# Patient Record
Sex: MALE | Race: WHITE | NOT HISPANIC OR LATINO | URBAN - METROPOLITAN AREA
[De-identification: names, ages, dates, MRNs, and addresses within clinical notes are randomized per-mention and may not be internally consistent; named-entity substitution may affect disease eponyms.]

---

## 2018-01-15 ENCOUNTER — CONVERSION ENCOUNTER (OUTPATIENT)
Dept: NEUROLOGY | Facility: CLINIC | Age: 83
End: 2018-01-15

## 2018-01-15 ENCOUNTER — OFFICE VISIT CONVERTED (OUTPATIENT)
Dept: FAMILY MEDICINE CLINIC | Facility: CLINIC | Age: 83
End: 2018-01-15
Attending: NURSE PRACTITIONER

## 2018-01-15 ENCOUNTER — OFFICE VISIT CONVERTED (OUTPATIENT)
Dept: NEUROLOGY | Facility: CLINIC | Age: 83
End: 2018-01-15
Attending: PSYCHIATRY & NEUROLOGY

## 2018-01-19 ENCOUNTER — OFFICE VISIT CONVERTED (OUTPATIENT)
Dept: ONCOLOGY | Facility: HOSPITAL | Age: 83
End: 2018-01-19
Attending: NURSE PRACTITIONER

## 2018-01-26 ENCOUNTER — OFFICE VISIT CONVERTED (OUTPATIENT)
Dept: PODIATRY | Facility: CLINIC | Age: 83
End: 2018-01-26
Attending: PODIATRIST

## 2018-02-14 ENCOUNTER — OFFICE VISIT CONVERTED (OUTPATIENT)
Dept: ONCOLOGY | Facility: HOSPITAL | Age: 83
End: 2018-02-14
Attending: NURSE PRACTITIONER

## 2018-02-15 ENCOUNTER — TELEPHONE CONVERTED (OUTPATIENT)
Dept: ONCOLOGY | Facility: HOSPITAL | Age: 83
End: 2018-02-15

## 2021-05-16 VITALS
BODY MASS INDEX: 22.33 KG/M2 | HEART RATE: 76 BPM | SYSTOLIC BLOOD PRESSURE: 100 MMHG | WEIGHT: 134 LBS | HEIGHT: 65 IN | DIASTOLIC BLOOD PRESSURE: 56 MMHG

## 2021-05-16 VITALS — OXYGEN SATURATION: 96 % | BODY MASS INDEX: 21.03 KG/M2 | HEART RATE: 63 BPM | WEIGHT: 134 LBS | HEIGHT: 67 IN

## 2021-05-16 VITALS
WEIGHT: 133.37 LBS | SYSTOLIC BLOOD PRESSURE: 86 MMHG | HEART RATE: 76 BPM | TEMPERATURE: 96.9 F | HEIGHT: 65 IN | DIASTOLIC BLOOD PRESSURE: 64 MMHG | RESPIRATION RATE: 16 BRPM | BODY MASS INDEX: 22.22 KG/M2

## 2021-05-28 VITALS
HEIGHT: 66 IN | DIASTOLIC BLOOD PRESSURE: 47 MMHG | HEART RATE: 62 BPM | WEIGHT: 136.69 LBS | OXYGEN SATURATION: 87 % | HEIGHT: 66 IN | TEMPERATURE: 96.8 F | RESPIRATION RATE: 16 BRPM | DIASTOLIC BLOOD PRESSURE: 43 MMHG | OXYGEN SATURATION: 96 % | RESPIRATION RATE: 14 BRPM | WEIGHT: 133.6 LBS | SYSTOLIC BLOOD PRESSURE: 103 MMHG | SYSTOLIC BLOOD PRESSURE: 83 MMHG | HEART RATE: 67 BPM | BODY MASS INDEX: 21.47 KG/M2 | BODY MASS INDEX: 21.97 KG/M2 | TEMPERATURE: 97.2 F

## 2021-05-28 NOTE — PROGRESS NOTES
Patient: BARB STEPHENSON     Acct: [id number]     Report: #[id number]  UNIT #: [id number]     : 1923    Encounter Date:2018  PRIMARY CARE: SABIHA ARBOLEDA [facility]  ***Signed***  --------------------------------------------------------------------------------------------------------------------  Chief Complaint      2018      LUNG CA            Current Plan      -Lung Cancer, Adenocarcinoma      -Left lower lung mass, TTF positive, napsin positive. p40, CK 5/6 negative.       -PDL-1 testing is pending still from [facility].       -Poor performance status. Patient uses a cane to ambulate. Has dementia.       -PET scan from 10/12/10098 shows: PET/CT demonstrating intensely hypermetabolic     5.5 cm mass in the left lower lobe. Minimally hypermetabolic 1.1 cm x 0.6 cm     lymph node posterior to the distal left mainstem bronchus. This is in close     proximity to the esophagus.      -Opdivo every 2 weeks x 12 cycles started on 2017.             -            -Interval Clinic Visit Changes      -Doing well with Opdivo. # 6 days.       -RTC in 2 weeks for next cycle.       -Patient denies diarrhea, will check TSH today.       -Noted dryness to bilateral hands, encouraged moisturizers.       -Will order scans prior to the next visit.              -Hypotension: Resolved.       -Blood pressure 83/43 at today's visit       -Normal saline bolus, 1 liter given.       -Medications reviewed.       -Close observation.             -Insomnia      -Has tried multiple meds.       -Will try Flexeril, 15 mg, at bedtime PRN.      -Daughter has Flexeril has med at home.      -She will let us know if she needs refill for med.             -Parkinson's Disease      -Diagnosed with Parkinson's syndrome.      -Started on Sinemet, and Pramiprexole      -Follows with Dr. Hou.       -Close observation.              -Lewy Body Dementia      -Uses cane for ambulation assistance.       -Takes Namenda, Aricept.      -LIves  with daughter      -Close observation.             Medical Evaluation of Chemotherapy Associated Toxicities Today      -Chemotherapy associated fatigue      -Patient's weakness has remained stable.      -Recommended Exercise.       -Continue close observation.      -Chemotherapy associated nausea      -Patient's nausea has remained stable.      -Currently on Anti-Nausea medications.       -Continue close observation.      -Chemotherapy associated anemia      -Does not require blood transfusion on today's visit.       -Transfuse blood when hemoglobin is less than 7.      -Continue close observation.      -Chemotherapy associated thrombocytopenia      -Does not require platelet transfusion on today's visit.       -Transfuse platelet when platelet count < 20,000.       -Continue close observation.      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       -Continue current management.       -Today's Evaluation      -ECOG 1.       -Patient's imaging exams, blood tests, physicians' notes, and any new findings     since our last visit were reviewed today to reassess patient's medical     treatment plan. .       -Old medical records were reviewed and summarized in chronological order in the     HPI today to maintain an updated medical record.       -Patient's radiology imaging tests from our last visit were reviewed     independently by me by direct visualization of the images.        -Patients current lab tests and medications were carefully reviewed to evaluate     patient's current treatment plan today. Proceed with chemotherapy plan.       -Patient was advised to call us right away if there are any new symptoms for an     urgent visit for further evaluation. Patient voiced understanding and agreed to     do so.      Intensive Monitor for Toxicity:  Labs Reviewed, Chemo Orders Reviewd, Meds\    Narcotics Reviewed      Labs Reviewed During Visit?:  Yes      Patient Education Provided:  Yes       ECOG Score:  1            Date      February 14, 2018.      Presents today with daughter for cycle 8 of Opdivo.             Doing well. Has no complaints.  Gaining weight.             Meds increased recently for Parkinson's disease per neurologist. Daughter     reports doing better.             Labs reviewed. OK for treatment today. Thyroid function WNL. TSH rising. Will     watch for now.      ECOG      ECOG Performance Status:  1            History and Present Illness      -September 14, 2017. pathology: NSCLC adencarcinoma type left lower lobe mass.     TTF-1 Napsin 1 positive, p40/CK 5/6 negative. PDL-1 testing pending.       -September 20, 2017. 1. Interval enlargement and decrease in density of the     previously demonstrated mass within the left lower lobe now measuring 5.5 x 3.9     cm in size the. The appearance is most compatible with tumor necrosis. There is     new surrounding air space disease which may be secondary to pneumonia,     atelectasis, or treatment related change. 2. New small left pleural effusion.     3. No change in noncalcified nodular densities within the right upper and lower     lobes. 4. No change in 1.3 cm nodular density anterior to the ascending aorta     which could represent a pericardial cyst or mildly enlarged mediastinal lymph     node.      -September 27, 2017. Head CT: No evidence of metastatic disease. No acute     intracranial abnormality.      -October 12, 2017. PET scan: PET/CT demonstrating intensely hypermetabolic 5.5     cm mass in the left lower lobe. Minimally hypermetabolic 1.1 cm x 0.6 cm lymph     node posterior to the distal left mainstem bronchus. This is in close proximity     to the esophagus.      October 13, 2017. Iron 45, Ferritin 82, Iron sat 13 %, TIBC 350, B-12 406,     Folate 16.0      -October 20, 2017. Cycle 1 of 12 Opdivo given.       -November 3, 2017. Cycle 2 of 12 Opdivo given.       -November 17, 2017. Cycle 3 of 12 Opdivo given. Fereheme  infusion given. TSH     3.01, Free T4 1.0      -November . Feraheme #1 given. No complications.       -December 1, 2017. Cycle 4 of Opdivo given.       -December . Cycle 5 Opdivo given. TSH 3.17, free T4 1.1.       -December 29, 2017. Cycle 6 of Opdivo given.       -January 19, 2018. Cycle 7 Opdivo given.       -February 14, 2018. Cycle 8 Opdivo. TSH 4.370, free T4 0.9.              Mr. Matt John is a 89 year old  male who presents for post hospital     discharge with his 2 daughters. Recently, hospitalized for pneumonia and     recently found to have lung cancer. Medical history includes: osteopenia,     hyperkalemia, tremors, allergic rhinitis, nocturnal leg cramps, restless leg     syndrome, Lewy body dementia, anemia, gait abnormality, anxiety disorder, sick     sinus syndrome, contact dermatitis,  squamous cell carcinoma, macular     degeneration, previous MI,  CAD. Surgical history includes: appendectomy,     rectal tumor, ear surgery, cataract surgery. Patient has multiple drug     allergies. See list. The patient lives with his daughter. ROS: 4 pound weight     loss,  constipation, chronic low back pain, cough, dementia, insomnia. Patient     quit smoking 8 years ago. Denies any            Vitals      Height 66 in / 167.64 cm      Weight 136 lbs 10.963 oz / 62 kg      BSA 1.70 m2      BMI 22.1 kg/m2      Temperature 96.8 F / 36 C - Temporal      Pulse 67      Respirations 14      Blood Pressure 103/47      Pulse Oximetry 96%, ROOM AIR            Allergies      Coded Allergies:             HYDROCORTISONE (Verified  Allergy, Unknown, RASH, ITCHING, 1/19/18)           ADHESIVE (Verified  Adverse Reaction, Unknown, CAUSES SKIN TEARS, 1/19/18)           LUTEIN (Verified  Adverse Reaction, Unknown, UNK REACTION, 1/19/18)           NEOMYCIN (Verified  Adverse Reaction, Unknown, UNK REACTION, 1/19/18)           SULFA (SULFONAMIDE ANTIBIOTICS) (Verified  Adverse Reaction, Unknown, UNK      REACTION, 1/19/18)           THIMEROSAL (Verified  Adverse Reaction, Unknown, UNK REACTION, 1/19/18)           ZINC (Verified  Adverse Reaction, Unknown, UNK REACTION, 1/19/18)            Medications      Last Reconciled on 2/14/18 12:33 by FAUSTINA TIPTON      Vitamin B Complex (Vitamin B Complex*) 1 Each Tablet      1 TAB PO QDAY, TAB         Reported         2/14/18       Pramipexole (Pramipexole) 0.125 Mg Tab      0.125 MG PO HS for 30 Days, #30 TAB         Reported         11/17/17       Carbidopa/Levodopa CR 25/100 Mg (Sinemet CR 25/100 Mg) 1 Each Tablet.er      1 TAB PO QID, TAB.SA         Reported         11/17/17       Multivitamins/Minerals (Ocuvite) 1 Each Tablet      1 EA PO BID, TAB         Reported         11/15/17       Memantine (Namenda) 10 Mg Tablet      10 MG PO QDAY, TAB         Reported         10/26/17       (Namzaric) 28/10  No Conflict Check      1 TAB PO HS         Prov: Patrice Swanson         10/5/17       Bisacodyl (Dulcolax) 5 Mg Tablet.      5-10 MG PO QDAY Y for CONSTIPATION, TAB         Reported         9/21/17       Aspirin (Aspirin*) 81 Mg Tab.chew      81 MG PO QDAY, #30 TAB.CHEW 0 Refills         Reported         9/14/17       Folic Acid (Folic Acid*) 0.4 Mg Tablet      400 MCG PO QDAY, TAB         Reported         9/14/17       Calcium Carb/Vit D3 (Calcium Carb 600 + D) 1 Each Tablet      1 EACH PO BID, #60 TAB 0 Refills         Reported         9/14/17       Metoprolol Succinate (Toprol XL*) 25 Mg Tab.er.24h      25 MG PO HS, #90         Reported         9/14/17       rOPINIRole HCl (Requip) 1 Mg Tablet      1 MG PO QAM, #270         Reported         9/14/17       Mirtazapine (Mirtazapine*) 30 Mg Tablet      30 MG PO HS, #30         Reported         9/14/17       DULoxetine (Cymbalta) 30 Mg Capsule.      30 MG PO QDAY, #30         Reported         9/14/17       Primidone (Mysoline*) 50 Mg Tablet      50 MG PO BID, #60         Reported         9/14/17            General  Information      Primary Provider:  SABIHA ARBOLEDA [facility]            Pain and Fatigue Scales      Pain Assessment:           Experiencing any pain?:  No         Pain Scale Used:  Numerical         Pain Intensity:  0      Fatigue:           Experiencing any fatigue?:  Yes         Fatigue (0-10 scale):  6            Chemo Status      On Oral Chemotherapy?:  No            Other      Clinical Trial Participant?:  No            Past Medical History      No Diabetes Type 1, No Diabetes Type 2, No Thyroid Disease, No COPD, No     Emphysema, No Hypertension, No Stroke, No High Cholesterol, Yes Heart Attack,     No Bleeding Condition, No Low or High RBC Count, No Low or High WBC Count, No     Low or High Platelet Coun, No Hepatitis, No Kidney Disease, No Depression, No     Alzheimer's Disease, No Mental Disease, No Seizures, No Arthritis, No     Osteoporosis, Yes Osteopenia, No Short of Air, No Sleep apnea, No Liver Disease    , No STD, No Enlarged Prostate, Yes Other (HYPERKALEMIA, TREMORS, ALLERGIC     PHINITIS, NOCTURNAL LEG CRAMPS, RESTLESS LEG SYNDROME, DEMENTIA WITH LEWY BODIES    , ANXIETY DISORDER, GAIT ABNORMALITY, SICK SINUS SYNDROME, CONTACT DERMATITIS,     CORONARY ARTERY DISEASE, NEUROPATHY)      Hematology/oncology:  REPORTS HX OF: Anemia, Lung cancer, DENIES HX OF:     Previous Treatment for CA, Bladder Cancer, Blood cancer, Brain cancer, Breast     cancer, Coagulopathy, Colon Cancer, Colorectal cancer, Endocrine cancer, Eye     cancer, GI cancer,  cancer, Kidney cancer, Leukemia, Leukocytosis, Leukopenia    , Liver cancer, Lymphoma, Musculoskeletal cancer, Myeloma, Neurologic cancer,     Oral cancer, Pancreatic Cancer, Prostate cancer, Skin cancer, Stomach cancer,     Testicular cancer, Thrombocytopenia, Thyroid cancer, Other cancer history,     Other hematologic history      Genetic/metabolic:  DENIES HX OF: Cystic fibrosis, Down syndrome, Other genetic     history, Other metabolic history            Past  Surgical History      REPORTS HX OF: Cataract extraction, Appendectomy, Skin cancer removal, Biopsy (    BRONCHOSCOPY NEEDLE BIOSPY), Other Past Surgical Hx (rectal tumor,ear, pacemaker    , vitreoretinal surgery for large pigment patch at Macular behind right eye,     laser eye surgery, ), DENIES HX OF: Thyroid surgery, Lung biopsy, CABG surgery,     Coronary stent, Valve replacement, Cholecystectomy, Splenectomy, Bladder surgery    , Nephrectomy, Joint replacement, Frature repair, Melanoma excision, Spinal     surgery, Breast biopsy, Lumpectomy, Mastectomy, bilateral, Mastectomy, right,     Mastectomy, left, Hysterectomy, Peg Tube Placement, VAD Placement            Family History      REPORTS HX OF: Other Cancer History (pancreatic cancer-brother), DENIES HX OF:     Anemia, Blood disorders, Blood Cancer, Breast cancer, Cervical cancer,     Coagulopathy, Colorectal cancer, Endocrine Cancer, Eye Cancer, GI Cancer,      Cancer, Kidney Cancer, Leukemia, Leukocytosis, Leukopenia, Liver Cancer, Lung     cancer, Lymphoma, Melanoma, Musculoskeletal Cancer, Myeloma, Neurologic Cancer,     Oral Cancer, Ovarian cancer, Prostate cancer, Skin Cancer, Stomach Cancer,     Testicular Cancer, Thrombocytopenia, Thyroid cancer, Uterine cancer, Other     Hematology History            Social History      No      lives with daughter.            Tobacco Use      Tobacco status:  Former smoker (1 ppd for 60 years quit for 8 years)      Quit status:  Quit date established (8 years ago)            Alcohol Use      Alcohol intake:  None            Substance Use      Substance use:  Denies use            ROS      General:  Complains of: Fatigue, Weight gain, Denies: Fever, Night sweats,     Weight loss      Eyes:  Denies: Blurred vision, Corrective lenses, Diplopia, Eye irritation, Eye     pain, Eye redness      Ears, nose, mouth, throat:  Denies: Headache, Seizures, Visual Changes, Hearing     loss, Sinus Congestion, Hoarseness, Sore  throat      Cardiovascular:  Denies: Chest pain, Irregular heartbeat, Palpitations, Swollen     ankles/legs      Respiratory:  Denies: Chest pain, Productive cough, Coughing blood      Gastrointestinal:  Complains of: Constipation, Denies: Nausea, Vomiting,     Problem swallowing, Frequent heartburn, Diarrhea      Kidney/Bladder:  Denies: Painful Urination, Blood in Urine, Incontinence,     Frequent Urination, Decreased Urine Stream      Musculoskeletal:  Denies: New Back pain, Leg Cramps, Painful Joints, Swollen     Joints, Muscle Pain, Muscle weakness      Skin:  DENIES: Bruises Easily, Hair changes, Lesions/changes in moles, Nail     changes      Neurological:  Denies: Dizziness, Fainting, Numbness\Tingling, Paralysis,     Seizures      Psychiatric:  Complains of: AAO X 3, Memory loss, Denies: Anxiety      Endocrine:  DiabetesThyroid DisorderOsteoporosis      Hematologic/lymphatic:  Denies: Bruising, Bleeding, Enlarged Lymph Nodes,     Recurrent infections            Vitals            Vital Signs              Date Time  Temp Pulse Resp B/P (MAP) Pulse Ox O2 Delivery O2 Flow Rate FiO2             1/19/18 09:55 97.2 62 16 83/43 87 ROOM AIR              General Appearance:  Alert, Oriented X3, Cooperative, No acute distress      Eyes:  Anicteric Sclerae, Moist Conjunctiva, PERRLA      HEENT:  Orophraynx clear, No Erythema, No Exudates      Neck:  Supple, Full ROM, No Masses or JVD      Respiratory:  CTAB, No Diminished Breath, No Rales, No Crackels      Breast\Chest:  Symmetrical, No Nipple Discharge      Abdomen\Gastro:  Soft, No Hernias, No Hepatosplenomegaly      Cardio:  RRR, No Murmur, No, Peripheral Edema      Skin:  Normal Temperature, Normal Tone, Normal Texture and Turgor      Psychiatric:  Appropriate Affect, Intact Judgement, AAO x3      Neuro:  Cranial Nerve II-XII Inta, No Focal Sensory Deficit      Genitourinary:  No Reed Catheter, No Bladder Distention      Muscularskeletal:  Normal Gait and Station,  Full ROM of extremeties      Extremities:  No Digital Cyanosis, Pedal Pulses Intact, Pedal Pulses Symetrical    , Weakness      Lymphatic:  No Cervical, No Axillary            Lab Results      Laboratory Tests      1/19/18 09:35            Hx Influenza Vaccination:  Yes (2017)      Date Influenza Vaccine Given:  Sep 1, 2017      Influenza Vaccine Declined:  No      Hx Pneumococcal Vaccination:  Yes (2017)      Encouraged to follow-up with:  PCP regarding preventative exams.                 Disclaimer: Converted document may not contain table formatting or lab diagrams. Please see [employer] for the authenticated document.

## 2021-05-28 NOTE — PROGRESS NOTES
Patient: BARB STEPHENSON     Acct: [id number]     Report: #[id number]  UNIT #: [id number]     : 1923    Encounter Date:2018  PRIMARY CARE: SABIHA ARBOLEDA [facility]  ***Signed***  --------------------------------------------------------------------------------------------------------------------  ADDENDUM: 18 1800          Addendum: Patrice Swanson on 18 @ 18:00             Patient was seen by me on             Patient was seen in clinic, physical exam was performed, lab and imaging tests     were noted, and above medical documentation was also done by me.  Thank you     very much for the opportunity to participate in your patient's care.  Warm     Regards.             Patrice Swanson MD FACP      Board Certified Hematologist      Board Certified Medical Oncologist            Chief Complaint      2018      LUNG CA, ADENOCARCINOMA            Current Plan      -Lung Cancer, Adenocarcinoma      -Left lower lung mass, TTF positive, napsin positive. p40, CK 5/6 negative.       -PDL-1 testing is pending still from [facility].       -Poor performance status. Patient uses a cane to ambulate. Has dementia.       -PET scan from 10/12/10385 shows: PET/CT demonstrating intensely hypermetabolic     5.5 cm mass in the left lower lobe. Minimally hypermetabolic 1.1 cm x 0.6 cm     lymph node posterior to the distal left mainstem bronchus. This is in close     proximity to the esophagus.      -Opdivo every 2 weeks x 12 cycles started on 2017.             -Hypotension      -Blood pressure 83/43 at today's visit       -Normal saline bolus, 1 liter given.       -Medications reviewed.       -Close observation.             -Interval Clinic Visit Changes      -Doing well with Opdivo.       -RTC in 2 weeks for next cycle.       -Normal saline bolus, 1 liter given for low blood pressure.             -Insomnia      -Has tried multiple meds.       -Will try Flexeril, 15 mg, at bedtime PRN.       -Daughter has Flexeril has med at home.      -She will let us know if she needs refill for med.             -Parkinson's Disease      -Diagnosed with Parkinson's syndrome.      -Started on Sinemet, and Pramiprexole      -Follows with Dr. Hou.       -Close observation.              -Lewy Body Dementia      -Uses cane for ambulation assistance.       -Takes Namenda, Aricept.      -LIves with daughter      -Close observation.             Medical Evaluation of Chemotherapy Associated Toxicities Today      -Chemotherapy associated fatigue      -Patient's weakness has remained stable.      -Recommended Exercise.       -Continue close observation.      -Chemotherapy associated nausea      -Patient's nausea has remained stable.      -Currently on Anti-Nausea medications.       -Continue close observation.      -Chemotherapy associated anemia      -Does not require blood transfusion on today's visit.       -Transfuse blood when hemoglobin is less than 7.      -Continue close observation.      -Chemotherapy associated thrombocytopenia      -Does not require platelet transfusion on today's visit.       -Transfuse platelet when platelet count < 20,000.       -Continue close observation.      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       -Continue current management.       -Today's Evaluation      -ECOG 1.       -Patient's imaging exams, blood tests, physicians' notes, and any new findings     since our last visit were reviewed today to reassess patient's medical     treatment plan. .       -Old medical records were reviewed and summarized in chronological order in the     HPI today to maintain an updated medical record.       -Patient's radiology imaging tests from our last visit were reviewed     independently by me by direct visualization of the images.        -Patients current lab tests and medications were carefully reviewed to evaluate     patient's current treatment plan today. Proceed  with chemotherapy plan.       -Patient was advised to call us right away if there are any new symptoms for an     urgent visit for further evaluation. Patient voiced understanding and agreed to     do so.      Hypotension       Hypotension, unspecified hypotension type       Hypotension type: unspecified hypotension type            Date      January 19, 2018      Presents for Opdivo treatment today for lung cancer. Daughter at chairside.             Blood pressure 83/43 today. Normal saline bolus given, 1 liter started.             Denies any Opdivo treatment related toxicities today.             labs and medications reviewed. OK for treatment.       l      ECOG      ECOG Performance Status:  1            History and Present Illness      -September 14, 2017. pathology: NSCLC adencarcinoma type left lower lobe mass.     TTF-1 Napsin 1 positive, p40/CK 5/6 negative. PDL-1 testing pending.       -September 20, 2017. 1. Interval enlargement and decrease in density of the     previously demonstrated mass within the left lower lobe now measuring 5.5 x 3.9     cm in size the. The appearance is most compatible with tumor necrosis. There is     new surrounding air space disease which may be secondary to pneumonia,     atelectasis, or treatment related change. 2. New small left pleural effusion.     3. No change in noncalcified nodular densities within the right upper and lower     lobes. 4. No change in 1.3 cm nodular density anterior to the ascending aorta     which could represent a pericardial cyst or mildly enlarged mediastinal lymph     node.      -September 27, 2017. Head CT: No evidence of metastatic disease. No acute     intracranial abnormality.      -October 12, 2017. PET scan: PET/CT demonstrating intensely hypermetabolic 5.5     cm mass in the left lower lobe. Minimally hypermetabolic 1.1 cm x 0.6 cm lymph     node posterior to the distal left mainstem bronchus. This is in close proximity     to the esophagus.       October 13, 2017. Iron 45, Ferritin 82, Iron sat 13 %, TIBC 350, B-12 406,     Folate 16.0      -October 20, 2017. Cycle 1 of 12 Opdivo given.       -November 3, 2017. Cycle 2 of 12 Opdivo given.       -November 17, 2017. Cycle 3 of 12 Opdivo given. Fereheme infusion given. TSH     3.01, Free T4 1.0      -November . Feraheme #1 given. No complications.       -December 1, 2017. Cycle 4 of Opdivo given.       -December . Cycle 5 Opdivo given. TSH 3.17, free T4 1.1.       -December 29, 2017. Cycle 6 of Opdivo given.       -January 19, 2018. Cycle 7 Opdivo given.              Mr. Matt John is a 89 year old  male who presents for post hospital     discharge with his 2 daughters. Recently, hospitalized for pneumonia and     recently found to have lung cancer. Medical history includes: osteopenia,     hyperkalemia, tremors, allergic rhinitis, nocturnal leg cramps, restless leg     syndrome, Lewy body dementia, anemia, gait abnormality, anxiety disorder, sick     sinus syndrome, contact dermatitis,  squamous cell carcinoma, macular     degeneration, previous MI,  CAD. Surgical history includes: appendectomy,     rectal tumor, ear surgery, cataract surgery. Patient has multiple drug     allergies. See list. The patient lives with his daughter. ROS: 4 pound weight     loss,  constipation, chronic low back pain, cough, dementia, insomnia. Patient     quit smoking 8 years ago. Denies any            Vitals      Height 66 in / 167.64 cm      Weight 133 lbs 9.580 oz / 60.6 kg      BSA 1.68 m2      BMI 21.6 kg/m2      Temperature 97.2 F / 36.22 C - Temporal      Pulse 62      Respirations 16      Blood Pressure 83/43      Pulse Oximetry 87%, ROOM AIR            Allergies      Coded Allergies:             HYDROCORTISONE (Verified  Allergy, Unknown, RASH, ITCHING, 12/29/17)           ADHESIVE (Verified  Adverse Reaction, Unknown, CAUSES SKIN TEARS, 12/29/17)           LUTEIN (Verified  Adverse Reaction,  Unknown, UNK REACTION, 12/29/17)           NEOMYCIN (Verified  Adverse Reaction, Unknown, UNK REACTION, 12/29/17)           SULFA (SULFONAMIDE ANTIBIOTICS) (Verified  Adverse Reaction, Unknown, UNK     REACTION, 12/29/17)           THIMEROSAL (Verified  Adverse Reaction, Unknown, UNK REACTION, 12/29/17)           ZINC (Verified  Adverse Reaction, Unknown, UNK REACTION, 12/29/17)            Medications      Last Reconciled on 1/20/18 15:34 by FAUSTINA TIPTON      Loperamide (Loperamide) 2 Mg Capsule      2 MG PO Q4H, #30 TAB 1 Refill         Prov: Patrice Swanson         12/1/17       Nystatin (Nystatin*) 100,000 Unit/1 Ml Oral.susp      10 ML SWISH.SWAL Q6HR for 10 Days, #400 ML 0 Refills         Prov: FAUSTINA TIPTON onc         11/17/17       Pramipexole (Pramipexole) 0.125 Mg Tab      0.125 MG PO HS for 30 Days, #30 TAB         Reported         11/17/17       Carbidopa/Levodopa CR 25/100 Mg (Sinemet CR 25/100 Mg) 1 Each Tablet.er      1 TAB PO TID, TAB.SA         Reported         11/17/17       traMADol (Ultram) 50 Mg Tablet      50 MG PO Q6H Y for PAIN, #4 TAB 0 Refills         Prov: Samuel Suh         11/15/17       Multivitamins/Minerals (Ocuvite) 1 Each Tablet      1 EA PO BID, TAB         Reported         11/15/17       Memantine (Namenda) 10 Mg Tablet      10 MG PO QDAY, TAB         Reported         10/26/17       Loratadine (Claritin) 10 Mg Tablet      10 MG PO HS Y for ALLERGIES, #30 TAB 0 Refills         Reported         10/5/17       (Namzaric) 28/10  No Conflict Check      1 TAB PO HS         Prov: Patrice Swanson         10/5/17       Bisacodyl (Dulcolax) 5 Mg Tablet.dr      5 MG PO QDAY Y for CONSTIPATION, TAB         Reported         9/21/17       Aspirin (Aspirin*) 81 Mg Tab.chew      81 MG PO QDAY, #30 TAB.CHEW 0 Refills         Reported         9/14/17       Folic Acid (Folic Acid*) 0.4 Mg Tablet      400 MCG PO QDAY, TAB         Reported         9/14/17       Calcium Carb/Vit D3 (Calcium  Carb 600 + D) 1 Each Tablet      1 EACH PO BID, #60 TAB 0 Refills         Reported         9/14/17       Metoprolol Succinate (Toprol XL*) 25 Mg Tab.er.24h      25 MG PO HS, #90         Reported         9/14/17       rOPINIRole HCl (Requip) 1 Mg Tablet      1 MG PO QAM, #270         Reported         9/14/17       Mirtazapine (Mirtazapine*) 30 Mg Tablet      30 MG PO HS, #30         Reported         9/14/17       DULoxetine (Cymbalta) 30 Mg Capsule.dr      30 MG PO QDAY, #30         Reported         9/14/17       Primidone (Mysoline*) 50 Mg Tablet      50 MG PO BID, #60         Reported         9/14/17            General Information      Primary Provider:  SABIHA ARBOLEDA gretchen            Pain and Fatigue Scales      Pain Assessment:           Experiencing any pain?:  No         Pain Scale Used:  Numerical      Fatigue:           Experiencing any fatigue?:  Yes         Fatigue (0-10 scale):  0 (none)            Chemo Status      On Oral Chemotherapy?:  No            Other      Clinical Trial Participant?:  No            Past Medical History      No Diabetes Type 1, No Diabetes Type 2, No Thyroid Disease, No COPD, No     Emphysema, No Hypertension, No Stroke, No High Cholesterol, Yes Heart Attack,     No Bleeding Condition, No Low or High RBC Count, No Low or High WBC Count, No     Low or High Platelet Coun, No Hepatitis, No Kidney Disease, No Depression, No     Alzheimer's Disease, No Mental Disease, No Seizures, No Arthritis, No     Osteoporosis, Yes Osteopenia, No Short of Air, No Sleep apnea, No Liver Disease    , No STD, No Enlarged Prostate, Yes Other (HYPERKALEMIA, TREMORS, ALLERGIC     PHINITIS, NOCTURNAL LEG CRAMPS, RESTLESS LEG SYNDROME, DEMENTIA WITH LEWY BODIES    , ANXIETY DISORDER, GAIT ABNORMALITY, SICK SINUS SYNDROME, CONTACT DERMATITIS,     CORONARY ARTERY DISEASE, NEUROPATHY)      Hematology/oncology:  REPORTS HX OF: Anemia, Lung cancer, DENIES HX OF:     Previous Treatment for CA, Bladder Cancer, Blood  cancer, Brain cancer, Breast     cancer, Coagulopathy, Colon Cancer, Colorectal cancer, Endocrine cancer, Eye     cancer, GI cancer,  cancer, Kidney cancer, Leukemia, Leukocytosis, Leukopenia    , Liver cancer, Lymphoma, Musculoskeletal cancer, Myeloma, Neurologic cancer,     Oral cancer, Pancreatic Cancer, Prostate cancer, Skin cancer, Stomach cancer,     Testicular cancer, Thrombocytopenia, Thyroid cancer, Other cancer history,     Other hematologic history      Genetic/metabolic:  DENIES HX OF: Cystic fibrosis, Down syndrome, Other genetic     history, Other metabolic history            Past Surgical History      REPORTS HX OF: Cataract extraction, Appendectomy, Skin cancer removal, Biopsy (    BRONCHOSCOPY NEEDLE BIOSPY), Other Past Surgical Hx (rectal tumor,ear, pacemaker    , vitreoretinal surgery for large pigment patch at Macular behind right eye,     laser eye surgery, ), DENIES HX OF: Thyroid surgery, Lung biopsy, CABG surgery,     Coronary stent, Valve replacement, Cholecystectomy, Splenectomy, Bladder surgery    , Nephrectomy, Joint replacement, Frature repair, Melanoma excision, Spinal     surgery, Breast biopsy, Lumpectomy, Mastectomy, bilateral, Mastectomy, right,     Mastectomy, left, Hysterectomy, Peg Tube Placement, VAD Placement            Family History      REPORTS HX OF: Other Cancer History (pancreatic cancer-brother), DENIES HX OF:     Anemia, Blood disorders, Blood Cancer, Breast cancer, Cervical cancer,     Coagulopathy, Colorectal cancer, Endocrine Cancer, Eye Cancer, GI Cancer,      Cancer, Kidney Cancer, Leukemia, Leukocytosis, Leukopenia, Liver Cancer, Lung     cancer, Lymphoma, Melanoma, Musculoskeletal Cancer, Myeloma, Neurologic Cancer,     Oral Cancer, Ovarian cancer, Prostate cancer, Skin Cancer, Stomach Cancer,     Testicular Cancer, Thrombocytopenia, Thyroid cancer, Uterine cancer, Other     Hematology History            Social History      No            Tobacco Use       Tobacco status:  Former smoker (1 ppd for 60 years quit for 8 years)      Quit status:  Quit date established (8 years ago)            Alcohol Use      Alcohol intake:  None            Substance Use      Substance use:  Denies use            ROS      General:  Denies: Appetite change, Fatigue, Fever, Night sweats, Weight gain      Eyes:  Complains of: Corrective lenses, Denies: Blurred vision, Diplopia, Eye     irritation, Eye pain      Ears, nose, mouth, throat:  Denies: Seizures, Visual Changes, Hearing loss,     Sinus Congestion, Hoarseness      Cardiovascular:  Denies: Irregular heartbeat, Palpitations, Swollen ankles/legs      Respiratory:  Denies: Chest pain, Shortness of Air, Productive cough, Coughing     blood      Gastrointestinal:  Denies: Nausea, Vomiting, Problem swallowing, Frequent     heartburn, Constipation, Diarrhea      Kidney/Bladder:  Denies: Painful Urination, Blood in Urine, Incontinence,     Frequent Urination      Musculoskeletal:  Denies: New Back pain, Leg Cramps, Painful Joints, Muscle Pain      Skin:  DENIES: Bruises Easily, Hair changes      Neurological:  Denies: Dizziness, Fainting, Seizures      Psychiatric:  Complains of: AAO X 3, Memory loss, Denies: Anxiety      Endocrine:  DiabetesThyroid Disorder      Hematologic/lymphatic:  Denies: Bruising, Bleeding, Enlarged Lymph Nodes            Vitals            Vital Signs              Date Time  Temp Pulse Resp B/P (MAP) Pulse Ox O2 Delivery O2 Flow Rate FiO2             12/29/17 10:32 98.6 60 16 96/44 98 ROOM AIR              General Appearance:  Alert, Oriented X3, Cooperative, No acute distress      Eyes:  Anicteric Sclerae, Moist Conjunctiva, PERRLA      HEENT:  Orophraynx clear, No Erythema, No Exudates      Neck:  Supple, Full ROM, No Masses or JVD      Respiratory:  CTAB, No Diminished Breath, No Rales, No Crackels      Breast\Chest:  Symmetrical, No Nipple Discharge      Abdomen\Gastro:  Soft, No Hernias, No Hepatosplenomegaly       Cardio:  RRR, No Murmur, No, Peripheral Edema      Skin:  Normal Temperature, Normal Tone, Normal Texture and Turgor      Psychiatric:  Appropriate Affect, Intact Judgement, AAO x3      Neuro:  Cranial Nerve II-XII Inta, No Focal Sensory Deficit      Genitourinary:  No Reed Catheter, No Bladder Distention      Muscularskeletal:  Normal Gait and Station, Full ROM of extremeties      Extremities:  No Digital Cyanosis, Pedal Pulses Intact, Pedal Pulses Symetrical    , Weakness      Lymphatic:  No Cervical, No Axillary            Lab Results      Laboratory Tests      12/29/17 09:45            Hx Influenza Vaccination:  Yes (2017)      Date Influenza Vaccine Given:  Sep 1, 2017      Influenza Vaccine Declined:  No      Hx Pneumococcal Vaccination:  Yes (2017)      Encouraged to follow-up with:  PCP regarding preventative exams.                 Disclaimer: Converted document may not contain table formatting or lab diagrams. Please see [employer] for the authenticated document.